# Patient Record
Sex: MALE | Race: WHITE | NOT HISPANIC OR LATINO | URBAN - METROPOLITAN AREA
[De-identification: names, ages, dates, MRNs, and addresses within clinical notes are randomized per-mention and may not be internally consistent; named-entity substitution may affect disease eponyms.]

---

## 2020-06-01 ENCOUNTER — OFFICE VISIT (OUTPATIENT)
Dept: URBAN - METROPOLITAN AREA CLINIC 68 | Facility: CLINIC | Age: 70
End: 2020-06-01

## 2020-06-08 ENCOUNTER — OFFICE VISIT (OUTPATIENT)
Dept: URBAN - METROPOLITAN AREA SURGERY CENTER 12 | Facility: SURGERY CENTER | Age: 70
End: 2020-06-08

## 2020-06-09 ENCOUNTER — LAB OUTSIDE AN ENCOUNTER (OUTPATIENT)
Dept: URBAN - METROPOLITAN AREA CLINIC 68 | Facility: CLINIC | Age: 70
End: 2020-06-09

## 2020-06-09 ENCOUNTER — TELEPHONE ENCOUNTER (OUTPATIENT)
Dept: URBAN - METROPOLITAN AREA CLINIC 68 | Facility: CLINIC | Age: 70
End: 2020-06-09

## 2020-06-09 LAB
01: (no result)
01: (no result)

## 2020-06-19 ENCOUNTER — TELEPHONE ENCOUNTER (OUTPATIENT)
Dept: URBAN - METROPOLITAN AREA CLINIC 68 | Facility: CLINIC | Age: 70
End: 2020-06-19

## 2022-06-04 ENCOUNTER — TELEPHONE ENCOUNTER (OUTPATIENT)
Dept: URBAN - METROPOLITAN AREA CLINIC 68 | Facility: CLINIC | Age: 72
End: 2022-06-04

## 2022-06-04 RX ORDER — PANTOPRAZOLE SODIUM 40 MG/1
PANTOPRAZOLE SODIUM( 40MG ORAL 1 DAILY ) DISCONTINUED -HX ENTRY TABLET, DELAYED RELEASE ORAL DAILY
OUTPATIENT
Start: 2018-03-12 | End: 2018-03-12

## 2022-06-04 RX ORDER — RABEPRAZOLE SODIUM 20 MG/1
TABLET, DELAYED RELEASE ORAL DAILY
Qty: 30 | Refills: 30 | OUTPATIENT
Start: 2015-06-22 | End: 2018-03-12

## 2022-06-04 RX ORDER — AMLODIPINE BESYLATE 5 MG/1
AMLODIPINE BESYLATE( 5MG ORAL  DAILY ) INACTIVE -HX ENTRY TABLET ORAL DAILY
OUTPATIENT
Start: 2018-03-12

## 2022-06-04 RX ORDER — SODIUM SULFATE, POTASSIUM SULFATE, MAGNESIUM SULFATE 17.5; 3.13; 1.6 G/ML; G/ML; G/ML
SOLUTION, CONCENTRATE ORAL AS DIRECTED
Qty: 1 | Refills: 0 | OUTPATIENT
Start: 2020-05-29 | End: 2020-05-30

## 2022-06-05 ENCOUNTER — TELEPHONE ENCOUNTER (OUTPATIENT)
Dept: URBAN - METROPOLITAN AREA CLINIC 68 | Facility: CLINIC | Age: 72
End: 2022-06-05

## 2022-06-05 RX ORDER — OMEPRAZOLE 40 MG/1
CAPSULE, DELAYED RELEASE PELLETS ORAL DAILY
Qty: 90 | Refills: 90 | Status: ACTIVE | COMMUNITY
Start: 2021-04-12

## 2022-06-05 RX ORDER — COLESEVELAM HYDROCHLORIDE 625 MG/1
TABLET, FILM COATED ORAL
Qty: 90 | Refills: 90 | Status: ACTIVE | COMMUNITY
Start: 2020-08-23

## 2022-06-05 RX ORDER — SIMETHICONE 125 MG/1
GAS-X EXTRA STRENGTH( 125MG ORAL  AS NEEDED ) ACTIVE -HX ENTRY CAPSULE, LIQUID FILLED ORAL AS NEEDED
Status: ACTIVE | COMMUNITY
Start: 2020-05-29

## 2022-06-05 RX ORDER — TURMERIC ROOT EXTRACT 500 MG
TURMERIC CURCUMIN( 500MG ORAL  DAILY ) ACTIVE -HX ENTRY CAPSULE ORAL DAILY
Status: ACTIVE | COMMUNITY
Start: 2020-05-29

## 2022-06-05 RX ORDER — AMLODIPINE BESYLATE 5 MG/1
AMLODIPINE BESYLATE( 5MG ORAL 1 DAILY ) ACTIVE -HX ENTRY TABLET ORAL DAILY
Status: ACTIVE | COMMUNITY
Start: 2020-05-29

## 2022-06-25 ENCOUNTER — TELEPHONE ENCOUNTER (OUTPATIENT)
Age: 72
End: 2022-06-25

## 2022-06-25 RX ORDER — RABEPRAZOLE SODIUM 20 MG/1
TABLET, DELAYED RELEASE ORAL DAILY
Qty: 30 | Refills: 30 | OUTPATIENT
Start: 2015-06-22 | End: 2018-03-12

## 2022-06-25 RX ORDER — AMLODIPINE BESYLATE 5 MG/1
AMLODIPINE BESYLATE( 5MG ORAL  DAILY ) INACTIVE -HX ENTRY TABLET ORAL DAILY
OUTPATIENT
Start: 2018-03-12

## 2022-06-25 RX ORDER — SODIUM SULFATE, POTASSIUM SULFATE, MAGNESIUM SULFATE 17.5; 3.13; 1.6 G/ML; G/ML; G/ML
SOLUTION, CONCENTRATE ORAL AS DIRECTED
Qty: 1 | Refills: 0 | OUTPATIENT
Start: 2020-05-29 | End: 2020-05-30

## 2022-06-25 RX ORDER — PANTOPRAZOLE 40 MG/1
PANTOPRAZOLE SODIUM( 40MG ORAL 1 DAILY ) DISCONTINUED -HX ENTRY TABLET, DELAYED RELEASE ORAL DAILY
OUTPATIENT
Start: 2018-03-12 | End: 2018-03-12

## 2022-06-26 ENCOUNTER — TELEPHONE ENCOUNTER (OUTPATIENT)
Age: 72
End: 2022-06-26

## 2022-06-26 RX ORDER — AMLODIPINE BESYLATE 5 MG/1
AMLODIPINE BESYLATE( 5MG ORAL 1 DAILY ) ACTIVE -HX ENTRY TABLET ORAL DAILY
Status: ACTIVE | COMMUNITY
Start: 2020-05-29

## 2022-06-26 RX ORDER — TURMERIC ROOT EXTRACT 500 MG
TURMERIC CURCUMIN( 500MG ORAL  DAILY ) ACTIVE -HX ENTRY CAPSULE ORAL DAILY
Status: ACTIVE | COMMUNITY
Start: 2020-05-29

## 2022-06-26 RX ORDER — OMEPRAZOLE 40 MG/1
CAPSULE, DELAYED RELEASE ORAL DAILY
Qty: 90 | Refills: 90 | Status: ACTIVE | COMMUNITY
Start: 2021-04-12

## 2022-06-26 RX ORDER — SIMETHICONE 125 MG/1
GAS-X EXTRA STRENGTH( 125MG ORAL  AS NEEDED ) ACTIVE -HX ENTRY CAPSULE, LIQUID FILLED ORAL AS NEEDED
Status: ACTIVE | COMMUNITY
Start: 2020-05-29

## 2022-06-26 RX ORDER — COLESEVELAM HYDROCHLORIDE 625 MG/1
TABLET, COATED ORAL
Qty: 90 | Refills: 90 | Status: ACTIVE | COMMUNITY
Start: 2020-08-23

## 2022-06-26 RX ORDER — ASPIRIN 81 MG/1
LOW-DOSE ASPIRIN( 81MG ORAL  DAILY ) ACTIVE -HX ENTRY TABLET, COATED ORAL DAILY
Status: ACTIVE | COMMUNITY
Start: 2020-05-29

## 2022-07-12 ENCOUNTER — OFFICE VISIT (OUTPATIENT)
Dept: URBAN - METROPOLITAN AREA CLINIC 68 | Facility: CLINIC | Age: 72
End: 2022-07-12

## 2022-07-12 RX ORDER — COLESEVELAM HYDROCHLORIDE 625 MG/1
TABLET, FILM COATED ORAL
Qty: 90 | Refills: 90 | Status: ACTIVE | COMMUNITY
Start: 2020-08-23

## 2022-07-12 RX ORDER — OMEPRAZOLE 40 MG/1
CAPSULE, DELAYED RELEASE PELLETS ORAL DAILY
Qty: 90 | Refills: 90 | Status: ACTIVE | COMMUNITY
Start: 2021-04-12

## 2022-07-12 RX ORDER — TURMERIC ROOT EXTRACT 500 MG
TURMERIC CURCUMIN( 500MG ORAL  DAILY ) ACTIVE -HX ENTRY CAPSULE ORAL DAILY
Status: ACTIVE | COMMUNITY
Start: 2020-05-29

## 2022-07-12 RX ORDER — PANTOPRAZOLE SODIUM 40 MG/1
1 TABLET TABLET, DELAYED RELEASE ORAL ONCE A DAY
Qty: 90 TABLET | Refills: 1 | OUTPATIENT
Start: 2022-07-12

## 2022-07-12 RX ORDER — SIMETHICONE 125 MG/1
GAS-X EXTRA STRENGTH( 125MG ORAL  AS NEEDED ) ACTIVE -HX ENTRY CAPSULE, LIQUID FILLED ORAL AS NEEDED
Status: ACTIVE | COMMUNITY
Start: 2020-05-29

## 2022-07-12 RX ORDER — AMLODIPINE BESYLATE 5 MG/1
AMLODIPINE BESYLATE( 5MG ORAL 1 DAILY ) ACTIVE -HX ENTRY TABLET ORAL DAILY
Status: ACTIVE | COMMUNITY
Start: 2020-05-29

## 2022-07-12 NOTE — HPI-MIGRATED HPI
Transition of Care : Patient evaluated due to GERD/hearburn.  Today referred having worsening GERD/heartburn Referred is currently on omeprazole 40mg with persistent hearburn.  Denied nausea, vomiting, abd pain, diarrhea or signs of gi bleeding

## 2022-07-12 NOTE — EXAM-MIGRATED EXAMINATIONS
General Examination: General appearance: -> alert, pleasant, well-nourished and in no acute distress   Head: -> normocephalic, atraumatic   Eyes: -> pupils equal, round, reactive to light and accommodation, sclera anicteric   Ears: -> normal   Oral cavity: -> mucosa moist   Neck / thyroid: ->    Rectal: -> not examined    Extremities: -> normal extremity with no clubbing, cyanosis or edema   Neurologic: -> alert and oriented, normal exam with no motor or sensory deficits   Lymph nodes: ->    Skin: -> skin is warm and dry, with no rashes, good skin turgor and normal hair distribution, with no suspicious skin lesions   Heart: -> regular rate and rhythm without murmurs, gallops, clicks or rubs   Lungs: -> clear to auscultation bilaterally, with good air movement and no rales, rhonchi or wheezes   Breasts: ->    Abdomen: -> soft with good bowel sounds, nontender, and no masses or hepatosplenomegaly

## 2022-08-18 ENCOUNTER — OFFICE VISIT (OUTPATIENT)
Dept: URBAN - METROPOLITAN AREA SURGERY CENTER 12 | Facility: SURGERY CENTER | Age: 72
End: 2022-08-18

## 2022-08-18 RX ORDER — OMEPRAZOLE 40 MG/1
CAPSULE, DELAYED RELEASE PELLETS ORAL DAILY
Qty: 90 | Refills: 90 | Status: ACTIVE | COMMUNITY
Start: 2021-04-12

## 2022-08-18 RX ORDER — AMLODIPINE BESYLATE 5 MG/1
AMLODIPINE BESYLATE( 5MG ORAL 1 DAILY ) ACTIVE -HX ENTRY TABLET ORAL DAILY
Status: ACTIVE | COMMUNITY
Start: 2020-05-29

## 2022-08-18 RX ORDER — SIMETHICONE 125 MG/1
GAS-X EXTRA STRENGTH( 125MG ORAL  AS NEEDED ) ACTIVE -HX ENTRY CAPSULE, LIQUID FILLED ORAL AS NEEDED
Status: ACTIVE | COMMUNITY
Start: 2020-05-29

## 2022-08-18 RX ORDER — TURMERIC ROOT EXTRACT 500 MG
TURMERIC CURCUMIN( 500MG ORAL  DAILY ) ACTIVE -HX ENTRY CAPSULE ORAL DAILY
Status: ACTIVE | COMMUNITY
Start: 2020-05-29

## 2022-08-18 RX ORDER — COLESEVELAM HYDROCHLORIDE 625 MG/1
TABLET, FILM COATED ORAL
Qty: 90 | Refills: 90 | Status: ACTIVE | COMMUNITY
Start: 2020-08-23

## 2022-08-18 RX ORDER — PANTOPRAZOLE SODIUM 40 MG/1
1 TABLET TABLET, DELAYED RELEASE ORAL ONCE A DAY
Qty: 90 TABLET | Refills: 1 | Status: ACTIVE | COMMUNITY
Start: 2022-07-12

## 2022-08-31 ENCOUNTER — OFFICE VISIT (OUTPATIENT)
Dept: URBAN - METROPOLITAN AREA CLINIC 68 | Facility: CLINIC | Age: 72
End: 2022-08-31

## 2022-08-31 RX ORDER — SIMETHICONE 125 MG/1
GAS-X EXTRA STRENGTH( 125MG ORAL  AS NEEDED ) ACTIVE -HX ENTRY CAPSULE, LIQUID FILLED ORAL AS NEEDED
Status: ACTIVE | COMMUNITY
Start: 2020-05-29

## 2022-08-31 RX ORDER — OMEPRAZOLE 40 MG/1
CAPSULE, DELAYED RELEASE PELLETS ORAL DAILY
Qty: 90 | Refills: 90 | Status: ACTIVE | COMMUNITY
Start: 2021-04-12

## 2022-08-31 RX ORDER — TURMERIC ROOT EXTRACT 500 MG
TURMERIC CURCUMIN( 500MG ORAL  DAILY ) ACTIVE -HX ENTRY CAPSULE ORAL DAILY
Status: ACTIVE | COMMUNITY
Start: 2020-05-29

## 2022-08-31 RX ORDER — COLESEVELAM HYDROCHLORIDE 625 MG/1
TABLET, FILM COATED ORAL
Qty: 90 | Refills: 90 | Status: ACTIVE | COMMUNITY
Start: 2020-08-23

## 2022-08-31 RX ORDER — AMLODIPINE BESYLATE 5 MG/1
AMLODIPINE BESYLATE( 5MG ORAL 1 DAILY ) ACTIVE -HX ENTRY TABLET ORAL DAILY
Status: ACTIVE | COMMUNITY
Start: 2020-05-29

## 2022-08-31 RX ORDER — PANTOPRAZOLE SODIUM 40 MG/1
1 TABLET TABLET, DELAYED RELEASE ORAL ONCE A DAY
Qty: 90 TABLET | Refills: 1 | Status: ACTIVE | COMMUNITY
Start: 2022-07-12

## 2022-08-31 NOTE — HPI-MIGRATED HPI
Transition of Care : Patient evaluated due to heartburn/gerd. Had recent EGD found with mild gastritis.  Today referred that  Denies nausea, vomits, dysphagia, odynophagia,  abdominal pain, diarrhea, constipation GI bleeding or weight loss

## 2022-11-01 ENCOUNTER — ERX REFILL RESPONSE (OUTPATIENT)
Dept: URBAN - METROPOLITAN AREA CLINIC 68 | Facility: CLINIC | Age: 72
End: 2022-11-01

## 2022-11-01 RX ORDER — PANTOPRAZOLE SODIUM 40 MG/1
1 TABLET TABLET, DELAYED RELEASE ORAL ONCE A DAY
Qty: 90 TABLET | Refills: 1 | OUTPATIENT

## 2022-11-01 RX ORDER — PANTOPRAZOLE SODIUM 40 MG/1
TAKE 1 TABLET BY MOUTH EVERY DAY FOR 90 DAYS TABLET, DELAYED RELEASE ORAL
Qty: 90 TABLET | Refills: 0 | OUTPATIENT

## 2024-02-23 ENCOUNTER — LAB (OUTPATIENT)
Dept: URBAN - METROPOLITAN AREA CLINIC 68 | Facility: CLINIC | Age: 74
End: 2024-02-23

## 2024-02-23 ENCOUNTER — OV CON (OUTPATIENT)
Dept: URBAN - METROPOLITAN AREA CLINIC 68 | Facility: CLINIC | Age: 74
End: 2024-02-23
Payer: MEDICARE

## 2024-02-23 VITALS
BODY MASS INDEX: 26.66 KG/M2 | OXYGEN SATURATION: 99 % | TEMPERATURE: 97.8 F | SYSTOLIC BLOOD PRESSURE: 122 MMHG | HEIGHT: 69 IN | WEIGHT: 180 LBS | DIASTOLIC BLOOD PRESSURE: 78 MMHG | HEART RATE: 66 BPM

## 2024-02-23 DIAGNOSIS — R10.11 RUQ PAIN: ICD-10-CM

## 2024-02-23 DIAGNOSIS — K59.00 CONSTIPATION, UNSPECIFIED CONSTIPATION TYPE: ICD-10-CM

## 2024-02-23 DIAGNOSIS — K62.5 RECTAL BLEEDING: ICD-10-CM

## 2024-02-23 DIAGNOSIS — R19.5 OCCULT BLOOD POSITIVE STOOL: ICD-10-CM

## 2024-02-23 PROBLEM — 301717006: Status: ACTIVE | Noted: 2024-02-23

## 2024-02-23 PROBLEM — 14760008: Status: ACTIVE | Noted: 2024-02-23

## 2024-02-23 PROBLEM — 12063002: Status: ACTIVE | Noted: 2024-02-23

## 2024-02-23 PROBLEM — 59614000: Status: ACTIVE | Noted: 2024-02-23

## 2024-02-23 PROCEDURE — 99214 OFFICE O/P EST MOD 30 MIN: CPT

## 2024-02-23 RX ORDER — FAMOTIDINE 40 MG/1
1 TABLET AT BEDTIME TABLET, FILM COATED ORAL ONCE A DAY
Status: ACTIVE | COMMUNITY
Start: 2024-02-23

## 2024-02-23 RX ORDER — TURMERIC ROOT EXTRACT 500 MG
TURMERIC CURCUMIN( 500MG ORAL  DAILY ) ACTIVE -HX ENTRY CAPSULE ORAL DAILY
Status: ACTIVE | COMMUNITY
Start: 2020-05-29

## 2024-02-23 RX ORDER — OMEPRAZOLE 40 MG/1
CAPSULE, DELAYED RELEASE PELLETS ORAL DAILY
Qty: 90 | Refills: 90 | Status: ACTIVE | COMMUNITY
Start: 2021-04-12

## 2024-02-23 RX ORDER — AMLODIPINE BESYLATE 5 MG/1
AMLODIPINE BESYLATE( 5MG ORAL 1 DAILY ) ACTIVE -HX ENTRY TABLET ORAL DAILY
Status: ACTIVE | COMMUNITY
Start: 2020-05-29

## 2024-02-23 RX ORDER — LACTULOSE 10 G/10G
1 PACKET AS NEEDED SOLUTION ORAL ONCE A DAY
Qty: 30 | Status: ACTIVE | COMMUNITY
Start: 2024-02-23 | End: 2024-03-24

## 2024-02-23 RX ORDER — POLYETHYLENE GLYCOL 3350, SODIUM SULFATE, SODIUM CHLORIDE, POTASSIUM CHLORIDE, ASCORBIC ACID, SODIUM ASCORBATE 140-9-5.2G
140GM/ML KIT ORAL 1
Qty: 1 | Refills: 0 | OUTPATIENT
Start: 2024-02-23 | End: 2024-02-24

## 2024-02-23 RX ORDER — SIMETHICONE 125 MG/1
GAS-X EXTRA STRENGTH( 125MG ORAL  AS NEEDED ) ACTIVE -HX ENTRY CAPSULE, LIQUID FILLED ORAL AS NEEDED
Status: ON HOLD | COMMUNITY
Start: 2020-05-29

## 2024-02-23 RX ORDER — PANTOPRAZOLE SODIUM 40 MG/1
TAKE 1 TABLET BY MOUTH EVERY DAY FOR 90 DAYS TABLET, DELAYED RELEASE ORAL
Qty: 90 TABLET | Refills: 0 | Status: DISCONTINUED | COMMUNITY

## 2024-02-23 RX ORDER — COLESEVELAM HYDROCHLORIDE 625 MG/1
TABLET, FILM COATED ORAL
Qty: 90 | Refills: 90 | Status: ON HOLD | COMMUNITY
Start: 2020-08-23

## 2024-02-23 NOTE — HPI-TODAY'S VISIT:
Patient presents for evaluation of positive FOBT and rectal bleeding. Refers was prompted to GI by PCP, especially in light of recent recurrent episodes of gross blood in toilet bowl. Refers hx of significant constipation and was started on lactulose, which did soften stool. Refers no recurrence of rectal bleeding since. Refers hx of GERD, well controlled with PPI/H2 blocker therapy once daily. Denies nausea, vomiting, dysphagia, odynophagia, heartburn, abdominal pain, diarrhea, melena, or weight loss

## 2024-02-23 NOTE — PHYSICAL EXAM NECK/THYROID:
normal appearance , without tenderness upon palpation , no deformities , trachea midline , Thyroid normal size , no masses , thyroid nontender
Cardiac Monitor/Defib/ACLS/Rescue Kit/O2/BVM

## 2024-02-23 NOTE — PHYSICAL EXAM GASTROINTESTINAL
Abdomen (Limited by body habitus)  , soft, nontender, nondistended , no guarding or rigidity , no masses palpable , normal bowel sounds , Liver and Spleen , no hepatomegaly present , no hepatosplenomegaly , liver nontender , spleen not palpable

## 2024-03-07 ENCOUNTER — COLON (OUTPATIENT)
Dept: URBAN - METROPOLITAN AREA SURGERY CENTER 12 | Facility: SURGERY CENTER | Age: 74
End: 2024-03-07
Payer: COMMERCIAL

## 2024-03-07 ENCOUNTER — LAB (OUTPATIENT)
Dept: URBAN - METROPOLITAN AREA CLINIC 4 | Facility: CLINIC | Age: 74
End: 2024-03-07
Payer: COMMERCIAL

## 2024-03-07 DIAGNOSIS — K63.89 OTHER SPECIFIED DISEASES OF INTESTINE: ICD-10-CM

## 2024-03-07 DIAGNOSIS — K63.5 POLYP OF SIGMOID COLON, UNSPECIFIED TYPE: ICD-10-CM

## 2024-03-07 DIAGNOSIS — C18.9 MALIGNANT NEOPLASM OF COLON, UNSPECIFIED: ICD-10-CM

## 2024-03-07 DIAGNOSIS — K64.8 OTHER HEMORRHOIDS: ICD-10-CM

## 2024-03-07 PROBLEM — 425711007: Status: ACTIVE | Noted: 2024-03-07

## 2024-03-07 PROCEDURE — 45385 COLONOSCOPY W/LESION REMOVAL: CPT | Performed by: INTERNAL MEDICINE

## 2024-03-07 PROCEDURE — 88305 TISSUE EXAM BY PATHOLOGIST: CPT | Performed by: PATHOLOGY

## 2024-03-07 PROCEDURE — 45381 COLONOSCOPY SUBMUCOUS NJX: CPT | Performed by: INTERNAL MEDICINE

## 2024-03-07 PROCEDURE — 88341 IMHCHEM/IMCYTCHM EA ADD ANTB: CPT | Performed by: PATHOLOGY

## 2024-03-07 PROCEDURE — 45380 COLONOSCOPY AND BIOPSY: CPT | Performed by: INTERNAL MEDICINE

## 2024-03-07 PROCEDURE — 88342 IMHCHEM/IMCYTCHM 1ST ANTB: CPT | Performed by: PATHOLOGY

## 2024-03-07 RX ORDER — AMLODIPINE BESYLATE 5 MG/1
AMLODIPINE BESYLATE( 5MG ORAL 1 DAILY ) ACTIVE -HX ENTRY TABLET ORAL DAILY
Status: ACTIVE | COMMUNITY
Start: 2020-05-29

## 2024-03-07 RX ORDER — OMEPRAZOLE 40 MG/1
CAPSULE, DELAYED RELEASE PELLETS ORAL DAILY
Qty: 90 | Refills: 90 | Status: ACTIVE | COMMUNITY
Start: 2021-04-12

## 2024-03-07 RX ORDER — FAMOTIDINE 40 MG/1
1 TABLET AT BEDTIME TABLET, FILM COATED ORAL ONCE A DAY
Status: ACTIVE | COMMUNITY
Start: 2024-02-23

## 2024-03-07 RX ORDER — LACTULOSE 10 G/10G
1 PACKET AS NEEDED SOLUTION ORAL ONCE A DAY
Qty: 30 | Status: ACTIVE | COMMUNITY
Start: 2024-02-23 | End: 2024-03-24

## 2024-03-07 RX ORDER — SIMETHICONE 125 MG/1
GAS-X EXTRA STRENGTH( 125MG ORAL  AS NEEDED ) ACTIVE -HX ENTRY CAPSULE, LIQUID FILLED ORAL AS NEEDED
Status: ON HOLD | COMMUNITY
Start: 2020-05-29

## 2024-03-07 RX ORDER — TURMERIC ROOT EXTRACT 500 MG
TURMERIC CURCUMIN( 500MG ORAL  DAILY ) ACTIVE -HX ENTRY CAPSULE ORAL DAILY
Status: ACTIVE | COMMUNITY
Start: 2020-05-29

## 2024-03-07 RX ORDER — COLESEVELAM HYDROCHLORIDE 625 MG/1
TABLET, FILM COATED ORAL
Qty: 90 | Refills: 90 | Status: ON HOLD | COMMUNITY
Start: 2020-08-23

## 2024-03-14 ENCOUNTER — OV EP (OUTPATIENT)
Dept: URBAN - METROPOLITAN AREA CLINIC 68 | Facility: CLINIC | Age: 74
End: 2024-03-14
Payer: COMMERCIAL

## 2024-03-14 ENCOUNTER — LAB (OUTPATIENT)
Dept: URBAN - METROPOLITAN AREA CLINIC 68 | Facility: CLINIC | Age: 74
End: 2024-03-14

## 2024-03-14 VITALS
WEIGHT: 180 LBS | SYSTOLIC BLOOD PRESSURE: 142 MMHG | DIASTOLIC BLOOD PRESSURE: 98 MMHG | HEIGHT: 69 IN | BODY MASS INDEX: 26.66 KG/M2

## 2024-03-14 DIAGNOSIS — K62.5 RECTAL BLEEDING: ICD-10-CM

## 2024-03-14 DIAGNOSIS — C18.9 COLON ADENOCARCINOMA: ICD-10-CM

## 2024-03-14 DIAGNOSIS — K59.00 CONSTIPATION, UNSPECIFIED CONSTIPATION TYPE: ICD-10-CM

## 2024-03-14 PROBLEM — 408645001: Status: ACTIVE | Noted: 2024-03-14

## 2024-03-14 PROCEDURE — 99214 OFFICE O/P EST MOD 30 MIN: CPT

## 2024-03-14 RX ORDER — AMLODIPINE BESYLATE 5 MG/1
AMLODIPINE BESYLATE( 5MG ORAL 1 DAILY ) ACTIVE -HX ENTRY TABLET ORAL DAILY
Status: ACTIVE | COMMUNITY
Start: 2020-05-29

## 2024-03-14 RX ORDER — LACTULOSE 10 G/10G
1 PACKET AS NEEDED SOLUTION ORAL ONCE A DAY
Qty: 30 | Status: ACTIVE | COMMUNITY
Start: 2024-02-23 | End: 2024-03-24

## 2024-03-14 RX ORDER — FAMOTIDINE 40 MG/1
1 TABLET AT BEDTIME TABLET, FILM COATED ORAL ONCE A DAY
Status: ACTIVE | COMMUNITY
Start: 2024-02-23

## 2024-03-14 RX ORDER — OMEPRAZOLE 40 MG/1
CAPSULE, DELAYED RELEASE PELLETS ORAL DAILY
Qty: 90 | Refills: 90 | Status: ACTIVE | COMMUNITY
Start: 2021-04-12

## 2024-03-14 RX ORDER — COLESEVELAM HYDROCHLORIDE 625 MG/1
TABLET, FILM COATED ORAL
Qty: 90 | Refills: 90 | Status: ACTIVE | COMMUNITY
Start: 2020-08-23

## 2024-03-14 RX ORDER — TURMERIC ROOT EXTRACT 500 MG
TURMERIC CURCUMIN( 500MG ORAL  DAILY ) ACTIVE -HX ENTRY CAPSULE ORAL DAILY
Status: ACTIVE | COMMUNITY
Start: 2020-05-29

## 2024-03-14 RX ORDER — SIMETHICONE 125 MG/1
GAS-X EXTRA STRENGTH( 125MG ORAL  AS NEEDED ) ACTIVE -HX ENTRY CAPSULE, LIQUID FILLED ORAL AS NEEDED
Status: ACTIVE | COMMUNITY
Start: 2020-05-29

## 2024-03-14 NOTE — HPI-TODAY'S VISIT:
Patient presents for follow up to review results s/p colonoscopy found with adenocarcinoma of the colon. Refers was prompted to GI by PCP, in light of positive FOBT and recent episodes of gross blood in toilet bowl. Refers hx of significant constipation, was started on Lactulose and refers no recurrence of rectal bleeding since. Denies nausea, vomiting, dysphagia, odynophagia, heartburn, abdominal pain, diarrhea, melena, or weight loss.

## 2024-03-19 ENCOUNTER — US ABD/PEL (OUTPATIENT)
Dept: URBAN - METROPOLITAN AREA CLINIC 67 | Facility: CLINIC | Age: 74
End: 2024-03-19
Payer: COMMERCIAL

## 2024-03-19 DIAGNOSIS — K76.0 FATTY (CHANGE OF) LIVER, NOT ELSEWHERE CLASSIFIED: ICD-10-CM

## 2024-03-19 DIAGNOSIS — R10.11 RIGHT UPPER QUADRANT PAIN: ICD-10-CM

## 2024-03-19 PROCEDURE — 93976 VASCULAR STUDY: CPT | Performed by: INTERNAL MEDICINE

## 2024-03-19 PROCEDURE — 76705 ECHO EXAM OF ABDOMEN: CPT | Performed by: INTERNAL MEDICINE

## 2024-03-22 PROBLEM — 197321007: Status: ACTIVE | Noted: 2024-03-22

## 2024-04-26 ENCOUNTER — OV EP (OUTPATIENT)
Dept: URBAN - METROPOLITAN AREA CLINIC 68 | Facility: CLINIC | Age: 74
End: 2024-04-26

## 2025-05-12 ENCOUNTER — OFFICE VISIT (OUTPATIENT)
Dept: URBAN - METROPOLITAN AREA CLINIC 68 | Facility: CLINIC | Age: 75
End: 2025-05-12